# Patient Record
(demographics unavailable — no encounter records)

---

## 2024-10-16 NOTE — ASSESSMENT
[FreeTextEntry1] : 1.  Osteoporosis: Underwent a DEXA in Jul 2024, which demonstrated a T score of -2.5 in the spine, a bit better in the hip (osteopenia).  Reasons for osteoporosis at such a young age: she went through menopause at age 32.  No history of steroids or smoking.  I discussed treatments, which included vit D, calcium and either zoledronic acid or denosumab (toxicities discussed). She will decide and let me know.  2.  Hyperlipidemia  Plan Lab tests Reviewed internal and/or external records of other providers Contact me Consider zoledronic acid or denosumab (toxicities discussed)

## 2024-10-16 NOTE — HISTORY OF PRESENT ILLNESS
[FreeTextEntry1] : 1.  Osteoporosis: Underwent a DEXA in Jul 2024, which demonstrated a T score of -2.5 in the spine, a bit better in the hip (osteopenia).  Reasons for osteoporosis at such a young age: she went through menopause at age 32.  No history of steroids or smoking.  I discussed treatments, which included vit D, calcium and either zoledronic acid or denosumab (toxicities discussed). She will decide and let me know.  2.  Hyperlipidemia  Meds rosuvastatin calcium vit D MVI lysine

## 2025-02-18 NOTE — REVIEW OF SYSTEMS
[Heartburn] : heartburn [Arthralgias] : arthralgias [Joint Pain] : joint pain [Joint Swelling] : joint swelling [Joint Stiffness] : joint stiffness [As Noted in HPI] : as noted in HPI [Negative] : Heme/Lymph

## 2025-02-18 NOTE — HISTORY OF PRESENT ILLNESS
[FreeTextEntry1] : 1.  Osteoporosis: Underwent a DEXA in Jul 2024, which demonstrated a T score of -2.5 in the spine, a bit better in the hip (osteopenia).  Reasons for osteoporosis at such a young age: she went through menopause at age 32.  No history of steroids or smoking.  I discussed treatments, which included vit D, calcium and either zoledronic acid or denosumab (toxicities discussed). She will decide and let me know.  2.  Hyperlipidemia  Meds rosuvastatin calcium vit D MVI lysine  ---------- 2/18/25 -- Here for 2nd opinion for OP. Early menopause aged 32 ( 2/2 gene for fragile X syndrome). Menses 16. + supplemental Vit D/Ca as some lactose intolerance. No hx of renal stones, never tobacco/ no ETOH/ no chronic steroids. No GERD since being on supplement, no dysphagia. Last saw dentist 1 year ago, no tooth pain. Steady on feet, no falls, walks 4-6K + steps at work as a , no personal fragility fx. No parental hip fx.   Occasional R 2nd PIP swelling x years accompanied by AM stiffness when active, prompting XRays in 2022 and 2024, much improved s/p mushroom coffee addition, no other active joints. Inflammatory arthritis ROS negative for symmetrical peripheral joint synovitis, prolonged AM stiffness, enthesitis, psoriasis/ rashes, eye inflammation, inflammatory low back pain, IBD.

## 2025-02-18 NOTE — PHYSICAL EXAM
[General Appearance - Alert] : alert [General Appearance - In No Acute Distress] : in no acute distress [General Appearance - Well Nourished] : well nourished [Sclera] : the sclera and conjunctiva were normal [Neck Appearance] : the appearance of the neck was normal [Respiration, Rhythm And Depth] : normal respiratory rhythm and effort [Edema] : there was no peripheral edema [No CVA Tenderness] : no ~M costovertebral angle tenderness [No Spinal Tenderness] : no spinal tenderness [Abnormal Walk] : normal gait [Nail Clubbing] : no clubbing  or cyanosis of the fingernails [Musculoskeletal - Swelling] : no joint swelling seen [Motor Tone] : muscle strength and tone were normal [FreeTextEntry1] : No synovitis/effusions, bony deformity or TTP. ROM diffusely intact  [] : no rash [No Focal Deficits] : no focal deficits [Oriented To Time, Place, And Person] : oriented to person, place, and time [Impaired Insight] : insight and judgment were intact [Affect] : the affect was normal

## 2025-02-18 NOTE — ASSESSMENT
[FreeTextEntry1] : SANG MCKINNEY is a 45 year old woman with below -- New to me today.    # OP on DEXA 7/2024 in setting of premature ovarian insufficiency/menopause at age 32 - agree with my colleague she would benefit from med management at this time given her age and early menopausal status. Given better GERD with supplement, we discussed PO bisphosphonates in addition to IV Reclast and Prolia as potential options. Risks and benefits were d/w patient including but not limited to myalgias, flu like symptoms, avascular necrosis of the jaw, gastric intolerance and atypical fractures. No plan for dental surgery, aware to notify us 3 months prior to any planned oral surgery. Pt was instructed to use the medication on an empty stomach and avoid a flat posture after 30 minutes of use. - discussed drug holiday after 5 years of use and 2 subsequent stable DEXAs - Will trial Fosamax qweekly until next visit  - Dental eval in meantime as well - c/w current supplemental Ca 600mg BID with food, Vit D 1000 IU daily - Increase weight bearing exercise for goal of 30min 3-4x/week to maintain BMD - modalities reviewed with patient  - check secondary OP labs as below - Repeat DEXA 7/2026  # Prior single digit synovitis/Am stiffness, XR with some periarticular osteopenia, improved with mushroom coffee supplement - ?  Inflammatory arthritis VS postmenopausal osteopenia in digits  - serologies as below - pt to keep log if sx recur   All questions and concerns addressed to my best possible ability, patient verbalizes understanding and is agreeable. RTC 2 months

## 2025-03-24 NOTE — PLAN
[FreeTextEntry1] : 45 year old female presents for an annual  -PAP done -Rx breast mammo/sono  RTO in 1 year

## 2025-03-24 NOTE — HISTORY OF PRESENT ILLNESS
[FreeTextEntry1] : 45 year old female presents for an annual. Pt is doing well with no complaints.  ObHx:  x2 (, 2016) GYNHx: premature ovarian failure, Frag X  PMHx: h/o right breast biopsy

## 2025-03-24 NOTE — PHYSICAL EXAM
[Chaperone Present] : A chaperone was present in the examining room during all aspects of the physical examination [FreeTextEntry2] : Sparkle Bunn [Appropriately responsive] : appropriately responsive [Alert] : alert [No Acute Distress] : no acute distress [Soft] : soft [Non-tender] : non-tender [Non-distended] : non-distended [No HSM] : No HSM [No Lesions] : no lesions [No Mass] : no mass [Oriented x3] : oriented x3 [Examination Of The Breasts] : a normal appearance [No Masses] : no breast masses were palpable [Labia Majora] : normal [Labia Minora] : normal [Normal] : normal [Uterine Adnexae] : normal

## 2025-03-24 NOTE — END OF VISIT
[FreeTextEntry3] : I, Sparkle Brian, acted as a scribe on behalf of Dr. Josie Hood D.O. on 03/24/2025.  All medical entries made by the scribe were at my, Dr. Josie Hood D.O, direction and personally dictated by me on 03/24/2025. I have reviewed the chart and agree that the record accurately reflects my personal performance of the history, physical exam, assessment and plan. I have also personally directed, reviewed, and agreed with the chart.

## 2025-04-15 NOTE — HISTORY OF PRESENT ILLNESS
[FreeTextEntry1] : 1.  Osteoporosis: Underwent a DEXA in Jul 2024, which demonstrated a T score of -2.5 in the spine, a bit better in the hip (osteopenia).  Reasons for osteoporosis at such a young age: she went through menopause at age 32.  No history of steroids or smoking.  I discussed treatments, which included vit D, calcium and either zoledronic acid or denosumab (toxicities discussed). She will decide and let me know.  2.  Hyperlipidemia  Meds rosuvastatin calcium vit D MVI lysine ---------- 2/18/25 -- Here for 2nd opinion for OP. Early menopause aged 32 ( 2/2 gene for fragile X syndrome). Menses 16. + supplemental Vit D/Ca as some lactose intolerance. No hx of renal stones, never tobacco/ no ETOH/ no chronic steroids. No GERD since being on supplement, no dysphagia. Last saw dentist 1 year ago, no tooth pain. Steady on feet, no falls, walks 4-6K + steps at work as a , no personal fragility fx. No parental hip fx.   Occasional R 2nd PIP swelling x years accompanied by AM stiffness when active, prompting XRays in 2022 and 2024, much improved s/p mushroom coffee addition, no other active joints. Inflammatory arthritis ROS negative for symmetrical peripheral joint synovitis, prolonged AM stiffness, enthesitis, psoriasis/ rashes, eye inflammation, inflammatory low back pain, IBD.   4/15/25 -- Some arthralgia s/p Fosamax doses, severe at first but waning with each dose, no other SE. Saw dentist, will be referring her to periodontist for some bone loss but no clear surgical plan at present. No falls, + supplemental vitamin D and Ca.  Stable R second PIP symptoms, no other joint inflammatory symptoms.

## 2025-04-15 NOTE — ASSESSMENT
[FreeTextEntry1] : SANG MCKINNEY is a 45 year old woman with below --    # OP on DEXA 7/2024 in setting of premature ovarian insufficiency/menopause at age 32. Stable GERD with supplement.  Secondary OP labs within range. # Dentist reporting some mild bone loss between images from 2017, 2023, 2025 - referred to periodontist  - c/w Fosamax qweekly  - discussed drug holiday after 5 years of use and 2 subsequent stable DEXAs - Proceed with periodontal eval, if needs any major dental surgery advised should stop Fosamax at least 1 month prior  - c/w current supplemental Ca not to exceed 600mg BID with food, Vit  IU daily - Increase weight bearing exercise for goal of 30min 3-4x/week to maintain BMD - modalities reviewed with patient  - Repeat DEXA 7/2026  # Prior single digit synovitis/Am stiffness, XR with some periarticular osteopenia, improved with mushroom coffee supplement - ? Inflammatory arthritis VS postmenopausal osteopenia in digits. Serological w/u negative except for low + MINDI but paucity of overt CTD sx  - will clinically monitor  - pt to keep log if sx recur   All questions and concerns addressed to my best possible ability, patient verbalizes understanding and is agreeable. RTC 6 months

## 2025-04-15 NOTE — REVIEW OF SYSTEMS
[Heartburn] : heartburn [Arthralgias] : arthralgias [Joint Pain] : joint pain [As Noted in HPI] : as noted in HPI [Negative] : Heme/Lymph [Joint Swelling] : no joint swelling [Joint Stiffness] : no joint stiffness

## 2025-04-15 NOTE — PHYSICAL EXAM
[General Appearance - Alert] : alert [General Appearance - In No Acute Distress] : in no acute distress [General Appearance - Well Nourished] : well nourished [Sclera] : the sclera and conjunctiva were normal [Neck Appearance] : the appearance of the neck was normal [Respiration, Rhythm And Depth] : normal respiratory rhythm and effort [Edema] : there was no peripheral edema [Abnormal Walk] : normal gait [Musculoskeletal - Swelling] : no joint swelling seen [] : no rash [No Focal Deficits] : no focal deficits [Oriented To Time, Place, And Person] : oriented to person, place, and time [Impaired Insight] : insight and judgment were intact [Affect] : the affect was normal [No Spinal Tenderness] : no spinal tenderness [FreeTextEntry1] : No synovitis/effusions, bony deformity or TTP. ROM diffusely intact

## 2025-07-22 NOTE — REASON FOR VISIT
[CV Risk Factors and Non-Cardiac Disease] : CV risk factors and non-cardiac disease [Consultation] : a consultation regarding [Chest Pain] : chest pain [Dyspnea] : dyspnea [Hyperlipidemia] : hyperlipidemia [FreeTextEntry1] : murmur

## 2025-07-22 NOTE — DISCUSSION/SUMMARY
[FreeTextEntry1] : Dr. Ward-(PRIOR VISIT and PMH WITH Dr. Ward): This is a 44-year-old female with past medical history significant for hyperlipidemia, occasional dyspnea on exertion, sharp chest pain approximately 6 to 9 months ago which was localized to the left side of her chest feeling like a tight band pressure-like in nature, who comes in for cardiac follow-up evaluation.   She denies chest pain, palpitations, dizziness or syncope. She has no history of rheumatic fever.  She does not drink excessive caffeine or alcohol.   Her cardiac risk factors include hyperlipidemia, and positive family history of heart disease (her father had heart attack at age 65).  She does not drink excessive caffeine or alcohol. Electrocardiogram done July 16, 2024 demonstrated normal sinus rhythm rate 72 bpm is otherwise remarkable for 1 premature ventricular contraction. Patient continues on Crestor 10 mg daily without side effects.  (Initial LDL cholesterol over 180 mg/dL). Lipid panel done April 29, 2024 demonstrated a cholesterol 158, HDL 58, triglycerides 193, LDL calculated 68, non-HDL cholesterol 100, LDL direct 67 mg/dL. The patient has a target LDL cholesterol remain on her current dose of Crestor. She is tolerating Crestor 10 mg daily. Lipid panel done August 21, 2023 demonstrated triglycerides of 172, cholesterol 168, HDL 57, LDL calculated 81, non-HDL cholesterol 111 mg/dL. Electrocardiogram done August 28, 2023 demonstrated normal sinus rhythm rate 75 bpm is otherwise remarkable for 1 premature ventricular contraction. The patient will increase her hydration,.  PMH: The patient went to the hospital December 2, 2022 with chest heaviness after lying down at nighttime.  Cardiac enzymes were negative and electrocardiogram was unremarkable. The patient had a normal exercise stress test December 27 2022. The patient symptoms may represent GERD/hiatal hernia and I recommended she see a gastroenterologist. Echo Doppler examination done December 27, 2022 demonstrated physiologic pulmonic valve regurgitation, minimal to mild tricuspid valve regurgitation, mild mitral valve regurgitation, normal ejection fraction of 65%. Blood work done March 17, 2021 demonstrated cholesterol of 263, HDL of 55, triglycerides 350, LDL calculated 139 mg/dL, non-HDL cholesterol 208 mg/dL, and direct LDL cholesterol 162 mg/dL. The patient had a normal exercise stress test April 19, 2021. Blood work done January 27, 2021 demonstrated a cholesterol of 266 mg/dL, triglycerides 343 mg/dL, LDL calculated 151 mg/dL, and non-HDL cholesterol 219 mg/dL.  The patient did not want to start on statin therapy at that time, and her primary care physician recommended over-the-counter fish oil. The patient's 10-year ASCVD risk score is 1.5%. The patient's LDL target should be less than 130 mg/dL. Electrocardiogram done March 16, 2021 demonstrated normal sinus rhythm rate 74 bpm and is otherwise unremarkable. Lifestyle modification including exercise, salt restriction, and caloric restriction were reinforced. I recommended she work with a registered dietitian to improve her lipid panel, and achieve weight loss. Hopefully with lifestyle modification, the patient can improve her lipid profile. The patient understands that aerobic exercises must be increased to 40 minutes 4 times per week. A detailed discussion of lifestyle modification was done today. The patient has a good understanding of the diagnosis, and treatment plan. Lifestyle modification was also outlined.

## 2025-07-22 NOTE — PHYSICAL EXAM
[Well Developed] : well developed [Well Nourished] : well nourished [No Acute Distress] : no acute distress [Normal Venous Pressure] : normal venous pressure [No Carotid Bruit] : no carotid bruit [Normal S1, S2] : normal S1, S2 [No Rub] : no rub [Rhythm Regular] : regular [II] : a grade 2 [Clear Lung Fields] : clear lung fields [Good Air Entry] : good air entry [No Respiratory Distress] : no respiratory distress  [Soft] : abdomen soft [Non Tender] : non-tender [No Masses/organomegaly] : no masses/organomegaly [Normal Bowel Sounds] : normal bowel sounds [Normal Gait] : normal gait [No Edema] : no edema [No Cyanosis] : no cyanosis [No Clubbing] : no clubbing [No Varicosities] : no varicosities [No Rash] : no rash [No Skin Lesions] : no skin lesions [Moves all extremities] : moves all extremities [No Focal Deficits] : no focal deficits [Normal Speech] : normal speech [Alert and Oriented] : alert and oriented [Normal memory] : normal memory [General Appearance - Well Developed] : well developed [Normal Appearance] : normal appearance [Well Groomed] : well groomed [General Appearance - Well Nourished] : well nourished [No Deformities] : no deformities [General Appearance - In No Acute Distress] : no acute distress [Normal Conjunctiva] : the conjunctiva exhibited no abnormalities [Normal Oral Mucosa] : normal oral mucosa [Normal Oropharynx] : normal oropharynx [Normal Jugular Venous A Waves Present] : normal jugular venous A waves present [Normal Jugular Venous V Waves Present] : normal jugular venous V waves present [No Jugular Venous Lamb A Waves] : no jugular venous lamb A waves [Respiration, Rhythm And Depth] : normal respiratory rhythm and effort [Exaggerated Use Of Accessory Muscles For Inspiration] : no accessory muscle use [Auscultation Breath Sounds / Voice Sounds] : lungs were clear to auscultation bilaterally [Bowel Sounds] : normal bowel sounds [Abdomen Soft] : soft [Abnormal Walk] : normal gait [Gait - Sufficient For Exercise Testing] : the gait was sufficient for exercise testing [Nail Clubbing] : no clubbing of the fingernails [Cyanosis, Localized] : no localized cyanosis [Skin Color & Pigmentation] : normal skin color and pigmentation [Skin Turgor] : normal skin turgor [] : no rash [Oriented To Time, Place, And Person] : oriented to person, place, and time [Affect] : the affect was normal [Mood] : the mood was normal [No Anxiety] : not feeling anxious [5th Left ICS - MCL] : palpated at the 5th LICS in the midclavicular line [Normal] : normal [No Precordial Heave] : no precordial heave was noted [Normal Rate] : normal [Normal S1] : normal S1 [Normal S2] : normal S2 [No Gallop] : no gallop heard [I] : a grade 1 [2+] : left 2+ [No Abnormalities] : the abdominal aorta was not enlarged and no bruit was heard [No Pitting Edema] : no pitting edema present [S3] : no S3 [S4] : no S4 [Right Carotid Bruit] : no bruit heard over the right carotid [Left Carotid Bruit] : no bruit heard over the left carotid [Right Femoral Bruit] : no bruit heard over the right femoral artery [Left Femoral Bruit] : no bruit heard over the left femoral artery

## 2025-07-22 NOTE — ASSESSMENT
[FreeTextEntry1] : This is a 45-year-old female here today for follow up cardiac evaluation.   She has a past medical history significant for HLD and PMH for chest pain (which resolved and she thinks this may be related to more anxiety).  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  She has no history of rheumatic fever.  She does not drink excessive caffeine or alcohol.    Cardiac risk factors include hyperlipidemia, and positive family history of heart disease (her father had heart attack at age 65).  She does not drink excessive caffeine or alcohol.   HPI: She is feeling generally well today and denies chest pain, dizziness, heart palpitations, recent episodes of syncope or falls, SOB, or dyspnea at this time.  Current Medications: Rosuvastatin 10 mg daily   Interested in trialing stopped her Rosuvastatin 10 mg daily to assess whether her cholesterol panel remains within acceptable limits with only lifestyle modification. She will repeat BW end of September 2025 for reassessment.    BLOOD PRESSURE: -BP is well controlled in today's visit.   BLOOD WORK: -Repeat BW to be done September 2025.  -Lipid panel done March 2025 demonstrated triglycerides 189, cholesterol 168, HDL 55, LDL 81, non-, LDL direct 83. -Lipid panel done April 29, 2024 demonstrated a cholesterol 158, HDL 58, triglycerides 193, LDL calculated 68, non-HDL cholesterol 100, LDL direct 67 mg/dL.    TESTING/REPORTS: -EKG done 07/22/2025 demonstrated regular sinus rhythm rate 66 bpm otherwise remarkable for 1 PAC and nonspecific ST-T wave changes.   -Echocardiogram done today 07/22/2025 in office with results pending.   -Electrocardiogram done July 16, 2024 demonstrated normal sinus rhythm rate 72 bpm is otherwise remarkable for 1 premature ventricular contraction.  -EKG done Jun 20, 2022 which demonstrated regular sinus rhythm with nonspecific ST-T wave changes, BPM of 74 with occasional PVC but she denies palpitations at this time. She states that she may be stressed from work or dehydrated.   -The patient had a normal exercise stress test April 19, 2021.  -Electrocardiogram done March 16, 2021 demonstrated normal sinus rhythm rate 74 bpm and is otherwise unremarkable.   PLAN: -She will continue her current medications and contact the office if problems arise before next follow-up appointment. -She will schedule an exercise stress test to evaluate for significant coronary artery disease.   I have discussed the plan of care with SANG MCKINNEY and she will follow up in 6-8 months. They are compliant with all of their medications.     The patient understands that aerobic exercises must be increased to 40 minutes 4 times/week and a detailed discussion of lifestyle modification was done today.   The patient has a good understanding of the diagnosis, treatment plan and lifestyle modification.   They will contact me at the office for any questions with their care or any changes in their health status.   The patient was discussed with supervision physician Dr. Corky Ward at the time of the visit and the plan of care will be carried out as noted above.     KINGSLEY Perez NP